# Patient Record
Sex: FEMALE | Race: BLACK OR AFRICAN AMERICAN | NOT HISPANIC OR LATINO | Employment: OTHER | URBAN - METROPOLITAN AREA
[De-identification: names, ages, dates, MRNs, and addresses within clinical notes are randomized per-mention and may not be internally consistent; named-entity substitution may affect disease eponyms.]

---

## 2017-02-10 ENCOUNTER — HOSPITAL ENCOUNTER (EMERGENCY)
Facility: HOSPITAL | Age: 47
Discharge: HOME | End: 2017-02-10
Payer: COMMERCIAL

## 2017-02-10 VITALS
RESPIRATION RATE: 18 BRPM | HEIGHT: 64 IN | HEART RATE: 99 BPM | TEMPERATURE: 99 F | BODY MASS INDEX: 31.92 KG/M2 | DIASTOLIC BLOOD PRESSURE: 98 MMHG | OXYGEN SATURATION: 99 % | SYSTOLIC BLOOD PRESSURE: 163 MMHG | WEIGHT: 187 LBS

## 2017-02-10 DIAGNOSIS — R05.9 COUGH: ICD-10-CM

## 2017-02-10 DIAGNOSIS — J01.11 ACUTE RECURRENT FRONTAL SINUSITIS: Primary | ICD-10-CM

## 2017-02-10 LAB
FLUAV AG SPEC QL IA: NEGATIVE
FLUBV AG SPEC QL IA: NEGATIVE
SPECIMEN SOURCE: NORMAL

## 2017-02-10 PROCEDURE — 87400 INFLUENZA A/B EACH AG IA: CPT

## 2017-02-10 PROCEDURE — 99283 EMERGENCY DEPT VISIT LOW MDM: CPT

## 2017-02-10 RX ORDER — PROMETHAZINE HYDROCHLORIDE AND DEXTROMETHORPHAN HYDROBROMIDE 6.25; 15 MG/5ML; MG/5ML
5 SYRUP ORAL 3 TIMES DAILY PRN
Qty: 240 ML | Refills: 0 | Status: SHIPPED | OUTPATIENT
Start: 2017-02-10 | End: 2017-02-20

## 2017-02-10 RX ORDER — LEVOFLOXACIN 500 MG/1
500 TABLET, FILM COATED ORAL DAILY
Qty: 5 TABLET | Refills: 0 | Status: SHIPPED | OUTPATIENT
Start: 2017-02-10 | End: 2017-02-15

## 2017-02-10 NOTE — ED AVS SNAPSHOT
[facility]  [address] [facility] [address]               Les Hernandze   2/10/2017 11:29 PM   ED    Description:  Female : 1970   Department:  [facility]            Your Care was Coordinated By:     Provider Role From To    Jacob Khoury NP Nurse Practitioner 02/10/17 2306 --      Reason for Visit     Nasal Congestion           Diagnoses this Visit        Comments    Acute recurrent frontal sinusitis    -  Primary     Cough           ED Disposition     None           To Do List           Follow-up Information     Follow up with Valeria Abbasi MD In 2 days.    Specialty:  Family Medicine    Contact information:    [address] [place] [id number]  [phone]         These Medications        Disp Refills Start End    levoFLOXacin (LEVAQUIN) 500 MG tablet 5 tablet 0 2/10/2017 2/15/2017    Take 1 tablet (500 mg total) by mouth once daily. - Oral    promethazine-dextromethorphan (PROMETHAZINE-DM) 6.25-15 mg/5 mL Syrp 240 mL 0 2/10/2017 2017    Take 5 mLs by mouth 3 (three) times daily as needed. - Oral      [facility]     [facility] -  Assistance  Registered nurses in the [facility] provide clinical advisement, health education, appointment booking, and other advisory services.  Call for this free service at [phone].             Medications           Message regarding Medications     Verify the changes and/or additions to your medication regime listed below are the same as discussed with your clinician today.  If any of these changes or additions are incorrect, please notify your healthcare provider.        START taking these NEW medications        Refills    levoFLOXacin (LEVAQUIN) 500 MG tablet 0    Sig: Take 1 tablet (500 mg total) by mouth once daily.    Class: Print    Route: Oral    promethazine-dextromethorphan (PROMETHAZINE-DM) 6.25-15 mg/5 mL Syrp 0    Sig:  "Take 5 mLs by mouth 3 (three) times daily as needed.    Class: Print    Route: Oral           Verify that the below list of medications is an accurate representation of the medications you are currently taking.  If none reported, the list may be blank. If incorrect, please contact your healthcare provider. Carry this list with you in case of emergency.           Current Medications     clonazePAM (KLONOPIN) 1 MG tablet Take 1 mg by mouth 2 (two) times daily as needed for Anxiety.    diclofenac (VOLTAREN) 50 MG EC tablet Take 1 tablet (50 mg total) by mouth 3 (three) times daily as needed (PAIN).    levoFLOXacin (LEVAQUIN) 500 MG tablet Take 1 tablet (500 mg total) by mouth once daily.    methocarbamol (ROBAXIN) 750 MG Tab Take 1 tablet (750 mg total) by mouth 2 (two) times daily as needed (Muscle relaxer).    metoprolol succinate (TOPROL-XL) 25 MG 24 hr tablet Take 25 mg by mouth once daily.    promethazine-dextromethorphan (PROMETHAZINE-DM) 6.25-15 mg/5 mL Syrp Take 5 mLs by mouth 3 (three) times daily as needed.           Clinical Reference Information           Your Vitals Were     BP Pulse Temp Resp Height Weight    163/98 (BP Location: Right arm, Patient Position: Sitting) 99 98.8 °F (37.1 °C) (Oral) 18 5' 4" (1.626 m) 84.8 kg (187 lb)    SpO2 BMI             99% 32.1 kg/m2         Allergies as of 2/10/2017        Reactions    Ibuprofen Diarrhea, Nausea And Vomiting      Immunizations Administered on Date of Encounter - 2/10/2017     None      ED Micro, Lab, POCT     Start Ordered       Status Ordering Provider    02/10/17 2207 02/10/17 2207  Influenza antigen Nasal Swab  STAT      Final result       ED Imaging Orders     None        Discharge Instructions         Sinusitis (Antibiotic Treatment)    The sinuses are air-filled spaces within the bones of the face. They connect to the inside of the nose. Sinusitis is an inflammation of the tissue lining the sinus cavity. Sinus inflammation can occur during a cold. " It can also be due to allergies to pollens and other particles in the air. Sinusitis can cause symptoms of sinus congestion and fullness. A sinus infection causes fever, headache and facial pain. There is often green or yellow drainage from the nose or into the back of the throat (post-nasal drip). You have been given antibiotics to treat this condition.  Home care:  · Take the full course of antibiotics as instructed. Do not stop taking them, even if you feel better.  · Drink plenty of water, hot tea, and other liquids. This may help thin mucus. It also may promote sinus drainage.  · Heat may help soothe painful areas of the face. Use a towel soaked in hot water. Or,  the shower and direct the hot spray onto your face. Using a vaporizer along with a menthol rub at night may also help.   · An expectorant containing guaifenesin may help thin the mucus and promote drainage from the sinuses.  · Over-the-counter decongestants may be used unless a similar medicine was prescribed. Nasal sprays work the fastest. Use one that contains phenylephrine or oxymetazoline. First blow the nose gently. Then use the spray. Do not use these medicines more often than directed on the label or symptoms may get worse. You may also use tablets containing pseudoephedrine. Avoid products that combine ingredients, because side effects may be increased. Read labels. You can also ask the pharmacist for help. (NOTE: Persons with high blood pressure should not use decongestants. They can raise blood pressure.)  · Over-the-counter antihistamines may help if allergies contributed to your sinusitis.    · Do not use nasal rinses or irrigation during an acute sinus infection, unless told to by your health care provider. Rinsing may spread the infection to other sinuses.  · Use acetaminophen or ibuprofen to control pain, unless another pain medicine was prescribed. (If you have chronic liver or kidney disease or ever had a stomach ulcer, talk  with your doctor before using these medicines. Aspirin should never be used in anyone under 18 years of age who is ill with a fever. It may cause severe liver damage.)  · Don't smoke. This can worsen symptoms.  Follow-up care  Follow up with your healthcare provider or our staff if you are not improving within the next week.  When to seek medical advice  Call your healthcare provider if any of these occur:  · Facial pain or headache becoming more severe  · Stiff neck  · Unusual drowsiness or confusion  · Swelling of the forehead or eyelids  · Vision problems, including blurred or double vision  · Fever of 100.4ºF (38ºC) or higher, or as directed by your healthcare provider  · Seizure  · Breathing problems  · Symptoms not resolving within 10 days  Date Last Reviewed: 4/13/2015  © 0703-2236 [employer]. [address] [place] [address]. All rights reserved. This information is not intended as a substitute for professional medical care. Always follow your healthcare professional's instructions.          [facility] Sign-Up     Activating your [facility] account is as easy as 1-2-3!     1) Visit [email or web address], select Sign Up Now, enter this activation code and your date of birth, then select Next.  [email or web address]  Expires: 2/14/2017 11:30 AM      2) Create a username and password to use when you visit [facility] in the future and select a security question in case you lose your password and select Next.    3) Enter your e-mail address and click Sign Up!    Additional Information  If you have questions, please e-mail [email or web address] or call [phone] to talk to our [facility] staff. Remember, [employer] is NOT to be used for urgent needs. For medical emergencies, dial 911.          [facility] complies with applicable Federal civil rights laws and does not discriminate on the basis of race, color, national origin, age, disability, or sex.        Language Assistance Services      ATTENTION: Language assistance services are available, free of charge. Please call [phone].      ATENCIÓN: Si habla español, tiene a amaya disposición servicios gratuitos de asistencia lingüística. Llame al [phone].     CHÚ Ý: N?u b?n nói Ti?ng Vi?t, có các d?ch v? h? tr? ngôn ng? mi?n phí dành cho b?n. G?i s? [phone].

## 2017-02-11 NOTE — DISCHARGE INSTRUCTIONS
Sinusitis (Antibiotic Treatment)    The sinuses are air-filled spaces within the bones of the face. They connect to the inside of the nose. Sinusitis is an inflammation of the tissue lining the sinus cavity. Sinus inflammation can occur during a cold. It can also be due to allergies to pollens and other particles in the air. Sinusitis can cause symptoms of sinus congestion and fullness. A sinus infection causes fever, headache and facial pain. There is often green or yellow drainage from the nose or into the back of the throat (post-nasal drip). You have been given antibiotics to treat this condition.  Home care:  · Take the full course of antibiotics as instructed. Do not stop taking them, even if you feel better.  · Drink plenty of water, hot tea, and other liquids. This may help thin mucus. It also may promote sinus drainage.  · Heat may help soothe painful areas of the face. Use a towel soaked in hot water. Or,  the shower and direct the hot spray onto your face. Using a vaporizer along with a menthol rub at night may also help.   · An expectorant containing guaifenesin may help thin the mucus and promote drainage from the sinuses.  · Over-the-counter decongestants may be used unless a similar medicine was prescribed. Nasal sprays work the fastest. Use one that contains phenylephrine or oxymetazoline. First blow the nose gently. Then use the spray. Do not use these medicines more often than directed on the label or symptoms may get worse. You may also use tablets containing pseudoephedrine. Avoid products that combine ingredients, because side effects may be increased. Read labels. You can also ask the pharmacist for help. (NOTE: Persons with high blood pressure should not use decongestants. They can raise blood pressure.)  · Over-the-counter antihistamines may help if allergies contributed to your sinusitis.    · Do not use nasal rinses or irrigation during an acute sinus infection, unless told to by  your health care provider. Rinsing may spread the infection to other sinuses.  · Use acetaminophen or ibuprofen to control pain, unless another pain medicine was prescribed. (If you have chronic liver or kidney disease or ever had a stomach ulcer, talk with your doctor before using these medicines. Aspirin should never be used in anyone under 18 years of age who is ill with a fever. It may cause severe liver damage.)  · Don't smoke. This can worsen symptoms.  Follow-up care  Follow up with your healthcare provider or our staff if you are not improving within the next week.  When to seek medical advice  Call your healthcare provider if any of these occur:  · Facial pain or headache becoming more severe  · Stiff neck  · Unusual drowsiness or confusion  · Swelling of the forehead or eyelids  · Vision problems, including blurred or double vision  · Fever of 100.4ºF (38ºC) or higher, or as directed by your healthcare provider  · Seizure  · Breathing problems  · Symptoms not resolving within 10 days  Date Last Reviewed: 4/13/2015  © 2120-2539 [employer]. [address] [place] [address], [place] [address]. All rights reserved. This information is not intended as a substitute for professional medical care. Always follow your healthcare professional's instructions.

## 2017-02-12 NOTE — ED PROVIDER NOTES
HISTORY     Chief Complaint   Patient presents with    Nasal Congestion     pt states she has had a cough for a few days and now has body aches     Review of patient's allergies indicates:   Allergen Reactions    Ibuprofen Diarrhea and Nausea And Vomiting        HPI   Patient is a 46 y.o. female presenting with the following complaint: general illness. The history is provided by the patient.   General Illness    The current episode started several days ago. The problem occurs rarely. The problem has been gradually worsening. The pain is at a severity of 5/10. Associated symptoms include a fever, congestion, ear pain, sore throat, muscle aches and cough. Pertinent negatives include no nausea, no shortness of breath and no rash. She has received no recent medical care.        PCP: Valeria Abbasi MD     Past Medical History:  Past Medical History   Diagnosis Date    Diabetes mellitus     High cholesterol     Hypertension     Schizophrenia         Past Surgical History:  Past Surgical History   Procedure Laterality Date    Splenectomy, total          Family History:  No family history on file.     Social History:  Social History     Social History Main Topics    Smoking status: Never Smoker    Smokeless tobacco: Not on file    Alcohol use No    Drug use: No    Sexual activity: Not on file         ROS   Review of Systems   Constitutional: Positive for fever.   HENT: Positive for congestion, ear pain and sore throat.    Respiratory: Positive for cough. Negative for shortness of breath.    Cardiovascular: Negative for chest pain.   Gastrointestinal: Negative for nausea.   Genitourinary: Negative for dysuria.   Musculoskeletal: Negative for back pain.   Skin: Negative for rash.   Neurological: Negative for weakness.   Hematological: Does not bruise/bleed easily.       PHYSICAL EXAM   Initial Vitals   BP Pulse Resp Temp SpO2   02/10/17 2204 02/10/17 2204 02/10/17 2204 02/10/17 2204 02/10/17 2204   163/98 99  "18 98.8 °F (37.1 °C) 99 %       Physical Exam    Constitutional: She appears well-developed and well-nourished. No distress.   HENT:   Head: Normocephalic and atraumatic.   Right Ear: A middle ear effusion is present.   Left Ear: A middle ear effusion is present.   Nose: Mucosal edema and rhinorrhea present. Right sinus exhibits frontal sinus tenderness. Left sinus exhibits frontal sinus tenderness.   Mouth/Throat: Posterior oropharyngeal erythema present.   Eyes: Conjunctivae are normal. Pupils are equal, round, and reactive to light.   Neck: Normal range of motion. Neck supple.   Cardiovascular: Normal rate, regular rhythm, normal heart sounds and intact distal pulses.   Pulmonary/Chest: Breath sounds normal.   Abdominal: Soft. Bowel sounds are normal. She exhibits no distension. There is no tenderness. There is no rebound.   Musculoskeletal: Normal range of motion. She exhibits no edema.   Neurological: She is alert and oriented to person, place, and time. She has normal strength.   Skin: Skin is warm and dry.   Psychiatric: She has a normal mood and affect.          ED COURSE   Procedures  ED ONGOING VITALS:  Vitals:    02/10/17 2204   BP: (!) 163/98   Pulse: 99   Resp: 18   Temp: 98.8 °F (37.1 °C)   TempSrc: Oral   SpO2: 99%   Weight: 84.8 kg (187 lb)   Height: 5' 4" (1.626 m)         ABNORMAL LAB VALUES:  Labs Reviewed   INFLUENZA A AND B ANTIGEN         ALL LAB VALUES:  Results for orders placed or performed during the hospital encounter of 02/10/17   Influenza antigen Nasal Swab   Result Value Ref Range    Influenza A Ag, EIA Negative Negative    Influenza B Ag, EIA Negative Negative    Flu A & B Source Nasal Swab            RADIOLOGY STUDIES:  Imaging Results     None                    The above vital signs and test results have been reviewed by the emergency provider.     ED Medications:  Medications - No data to display    Discharge Medications:  Discharge Medication List as of 2/10/2017 11:35 PM    "   START taking these medications    Details   levoFLOXacin (LEVAQUIN) 500 MG tablet Take 1 tablet (500 mg total) by mouth once daily., Starting 2/10/2017, Until Wed 2/15/17, Print      promethazine-dextromethorphan (PROMETHAZINE-DM) 6.25-15 mg/5 mL Syrp Take 5 mLs by mouth 3 (three) times daily as needed., Starting 2/10/2017, Until Mon 2/20/17, Print            Follow-up Information     Follow up with Valeria Abbasi MD In 2 days.    Specialty:  Family Medicine    Contact information:    [facility] [address]  [phone]           I discussed with patient and/or family/caretaker that evaluation in the ED does not suggest any emergent or life threatening medical conditions requiring immediate intervention beyond what was provided in the ED, and I believe patient is safe for discharge. Regardless, an unremarkable evaluation in the ED does not preclude the development or presence of a serious or life threatening condition. As such, patient was instructed to return immediately for any worsening or change in current symptoms.    Pre-hypertension/Hypertension: The pt has been informed that they may have pre-hypertension or hypertension based on a blood pressure reading in the ED. I recommend that the pt call the PCP listed on their discharge instructions or a physician of their choice this week to arrange f/u for further evaluation of possible pre-hypertension or hypertension.       MEDICAL DECISION MAKING                 CLINICAL IMPRESSION       ICD-10-CM ICD-9-CM   1. Acute recurrent frontal sinusitis J01.11 461.1   2. Cough R05 786.2               Jacob Khoury NP  02/11/17 2154

## 2017-04-24 ENCOUNTER — HOSPITAL ENCOUNTER (EMERGENCY)
Facility: HOSPITAL | Age: 47
Discharge: HOME | End: 2017-04-24
Attending: EMERGENCY MEDICINE
Payer: COMMERCIAL

## 2017-04-24 VITALS
RESPIRATION RATE: 16 BRPM | WEIGHT: 199 LBS | TEMPERATURE: 98 F | SYSTOLIC BLOOD PRESSURE: 146 MMHG | BODY MASS INDEX: 31.98 KG/M2 | HEIGHT: 66 IN | OXYGEN SATURATION: 96 % | HEART RATE: 94 BPM | DIASTOLIC BLOOD PRESSURE: 79 MMHG

## 2017-04-24 DIAGNOSIS — R05.9 COUGH: Primary | ICD-10-CM

## 2017-04-24 PROCEDURE — 99283 EMERGENCY DEPT VISIT LOW MDM: CPT

## 2017-04-24 RX ORDER — PROMETHAZINE HYDROCHLORIDE AND DEXTROMETHORPHAN HYDROBROMIDE 6.25; 15 MG/5ML; MG/5ML
5 SYRUP ORAL EVERY 6 HOURS PRN
Qty: 120 ML | Refills: 0 | Status: SHIPPED | OUTPATIENT
Start: 2017-04-24 | End: 2017-05-04

## 2017-04-24 NOTE — ED AVS SNAPSHOT
[facility]  [address] [facility] [address]               Les Hernandez   2017  1:07 PM   ED    Description:  Female : 1970   Department:  [facility]            Your Care was Coordinated By:     Provider Role From To    Cooper Flynn MD Attending Provider 17 1307 --    Darcy Nixon PA-C Physician Assistant 17 1307 17 1307      Reason for Visit     Cough           Diagnoses this Visit        Comments    Cough    -  Primary       ED Disposition     ED Disposition Condition Comment    Discharge             To Do List           Follow-up Information     Follow up with Valeria Abbasi MD In 2 days.    Specialty:  Family Medicine    Contact information:    [address] [place] [id number] [phone]         These Medications        Disp Refills Start End    promethazine-dextromethorphan (PROMETHAZINE-DM) 6.25-15 mg/5 mL Syrp 120 mL 0 2017    Take 5 mLs by mouth every 6 (six) hours as needed. - Oral      [facility]     [facility] Care [facility] -  Assistance  Unless otherwise directed by your provider, please contact [facility], our nurse care line that is available for  assistance.     Registered nurses in the [facility] provide: appointment scheduling, clinical advisement, health education, and other advisory services.  Call: [phone] (toll free)               Medications           Message regarding Medications     Verify the changes and/or additions to your medication regime listed below are the same as discussed with your clinician today.  If any of these changes or additions are incorrect, please notify your healthcare provider.        START taking these NEW medications        Refills    promethazine-dextromethorphan (PROMETHAZINE-DM) 6.25-15 mg/5 mL Syrp 0    Sig: Take 5 mLs by mouth every 6 (six) hours as needed.    Class: Print  "   Route: Oral           Verify that the below list of medications is an accurate representation of the medications you are currently taking.  If none reported, the list may be blank. If incorrect, please contact your healthcare provider. Carry this list with you in case of emergency.           Current Medications     clonazePAM (KLONOPIN) 1 MG tablet Take 1 mg by mouth 2 (two) times daily as needed for Anxiety.    diclofenac (VOLTAREN) 50 MG EC tablet Take 1 tablet (50 mg total) by mouth 3 (three) times daily as needed (PAIN).    methocarbamol (ROBAXIN) 750 MG Tab Take 1 tablet (750 mg total) by mouth 2 (two) times daily as needed (Muscle relaxer).    metoprolol succinate (TOPROL-XL) 25 MG 24 hr tablet Take 25 mg by mouth once daily.    promethazine-dextromethorphan (PROMETHAZINE-DM) 6.25-15 mg/5 mL Syrp Take 5 mLs by mouth every 6 (six) hours as needed.           Clinical Reference Information           Your Vitals Were     BP Pulse Temp Resp Height Weight    146/79 (BP Location: Right arm, Patient Position: Sitting) 94 98.3 °F (36.8 °C) (Oral) 16 5' 6" (1.676 m) 90.3 kg (199 lb)    SpO2 BMI             96% 32.12 kg/m2         Allergies as of 4/24/2017        Reactions    Ibuprofen Diarrhea, Nausea And Vomiting      Immunizations Administered on Date of Encounter - 4/24/2017     None      ED Micro, Lab, POCT     None      ED Imaging Orders     None      Discharge References/Attachments     VIRAL SYNDROME (ADULT) (ENGLISH)      [facility] Sign-Up     Activating your [facility] account is as easy as 1-2-3!     1) Visit [email or web address], select Sign Up Now, enter this activation code and your date of birth, then select Next.  [email or web address]  Expires: 6/8/2017  1:29 PM      2) Create a username and password to use when you visit [facility] in the future and select a security question in case you lose your password and select Next.    3) Enter your e-mail address and click Sign Up!    Additional Information  If you have " questions, please e-mail [email or web address] or call [phone] to talk to our [facility] staff. Remember, [facility] is NOT to be used for urgent needs. For medical emergencies, dial 911.          [facility] complies with applicable Federal civil rights laws and does not discriminate on the basis of race, color, national origin, age, disability, or sex.        Language Assistance Services     ATTENTION: Language assistance services are available, free of charge. Please call [phone].      ATENCIÓN: Si habla español, tiene a amaya disposición servicios gratuitos de asistencia lingüística. Llame al [phone].     CHÚ Ý: N?u b?n nói Ti?ng Vi?t, có các d?ch v? h? tr? ngôn ng? mi?n phí dành cho b?n. G?i s? [phone].

## 2017-04-24 NOTE — ED PROVIDER NOTES
SCRIBE #1 NOTE: I, Ayla Meredith, am scribing for, and in the presence of, Cooper Flynn MD. I have scribed the entire note.      History      Chief Complaint   Patient presents with    Cough     pt states the top of her back is tight with alot of coughing       Review of patient's allergies indicates:   Allergen Reactions    Ibuprofen Diarrhea and Nausea And Vomiting        HPI   HPI    4/24/2017, 1:27 PM   History obtained from the patient      History of Present Illness: Les Hernandez is a 47 y.o. female patient who presents to the Emergency Department for a cough which onset gradually 3 days ago. Cough is productive with clear sputum. Sx have been episodic and moderate in severity. No modifying factors noted. No associated sx included. Pt denies any fever, chills, CP, SOB, ear pain, sore throat, or rash. No further complaints at this time.       Arrival mode: Personal vehicle     PCP: Valeria Abbasi MD       Past Medical History:  Past Medical History:   Diagnosis Date    Diabetes mellitus     High cholesterol     Hypertension     Schizophrenia        Past Surgical History:  Past Surgical History:   Procedure Laterality Date    SPLENECTOMY, TOTAL           Family History:  No family history on file.    Social History:  Social History     Social History Main Topics    Smoking status: Never Smoker    Smokeless tobacco: Not on file    Alcohol use No    Drug use: No    Sexual activity: Not on file       ROS   Review of Systems   Constitutional: Negative for chills, diaphoresis and fever.   HENT: Negative for ear discharge, ear pain, sore throat and trouble swallowing.    Respiratory: Positive for cough. Negative for shortness of breath.    Cardiovascular: Negative for chest pain.   Gastrointestinal: Negative for nausea and vomiting.   Genitourinary: Negative for dysuria.   Musculoskeletal: Negative for back pain.   Skin: Negative for rash.   Neurological: Negative for dizziness, weakness,  "light-headedness, numbness and headaches.   Hematological: Does not bruise/bleed easily.       Physical Exam    Initial Vitals   BP Pulse Resp Temp SpO2   04/24/17 1258 04/24/17 1258 04/24/17 1258 04/24/17 1258 04/24/17 1258   146/79 94 16 98.3 °F (36.8 °C) 96 %      Physical Exam  Nursing Notes and Vital Signs Reviewed.  Constitutional: Patient is in no acute distress. Awake and alert. Well-developed and well-nourished.  Head: Atraumatic. Normocephalic.  Eyes: PERRL. EOM intact. Conjunctivae are not pale. No scleral icterus.  ENT: Mucous membranes are moist. Oropharynx is clear and symmetric.    Neck: Supple. Full ROM. No lymphadenopathy.  Cardiovascular: Regular rate. Regular rhythm. No murmurs, rubs, or gallops. Distal pulses are 2+ and symmetric.  Pulmonary/Chest: No respiratory distress. Clear to auscultation bilaterally. No wheezing, rales, or rhonchi.  Musculoskeletal: Moves all extremities. No obvious deformities.   Skin: Warm and dry.  Neurological:  Alert, awake, and appropriate.  Normal speech.  No acute focal neurological deficits are appreciated.  Psychiatric: Normal affect. Good eye contact. Appropriate in content.    ED Course    Procedures  ED Vital Signs:  Vitals:    04/24/17 1258   BP: (!) 146/79   Pulse: 94   Resp: 16   Temp: 98.3 °F (36.8 °C)   TempSrc: Oral   SpO2: 96%   Weight: 90.3 kg (199 lb)   Height: 5' 6" (1.676 m)                The Emergency Provider reviewed the vital signs and test results, which are outlined above.    ED Discussion     1:29 PM Discharge: Initial encounter.  Pt assessed at this time.  Discussed exam results, shared treatment plan, f/u instructions, and specific conditions for return. Answered their questions at this time. Pt understands and agrees to the plan. Pt is stable for discharge.       New Prescriptions    PROMETHAZINE-DEXTROMETHORPHAN (PROMETHAZINE-DM) 6.25-15 MG/5 ML SYRP    Take 5 mLs by mouth every 6 (six) hours as needed.       Follow-up Information     " Follow up with Valeria Abbasi MD In 2 days.    Specialty:  Family Medicine    Contact information:    [address] [place] [address] [id number] [phone]              Medical Decision Making              Scribe Attestation:   Scribe #1: I performed the above scribed service and the documentation accurately describes the services I performed. I attest to the accuracy of the note.    Attending:   Physician Attestation Statement for Scribe #1: I, Cooper Flynn MD, personally performed the services described in this documentation, as scribed by Ayla Meredith, in my presence, and it is both accurate and complete.          Clinical Impression       ICD-10-CM ICD-9-CM   1. Cough R05 786.2       Disposition:   Disposition: Discharged  Condition: Stable         Cooper Flynn MD  04/24/17 7603

## 2017-04-24 NOTE — ED NOTES
Pt examined by DR Flynn  without RN, educated on prescriptions, given discharge instructions and discharged to [facility]. See provider notes for exam.

## 2017-11-16 ENCOUNTER — HOSPITAL ENCOUNTER (EMERGENCY)
Facility: HOSPITAL | Age: 47
Discharge: HOME | End: 2017-11-16
Attending: EMERGENCY MEDICINE
Payer: COMMERCIAL

## 2017-11-16 VITALS
WEIGHT: 188.94 LBS | OXYGEN SATURATION: 99 % | HEART RATE: 90 BPM | RESPIRATION RATE: 18 BRPM | SYSTOLIC BLOOD PRESSURE: 138 MMHG | BODY MASS INDEX: 30.49 KG/M2 | TEMPERATURE: 98 F | DIASTOLIC BLOOD PRESSURE: 87 MMHG

## 2017-11-16 DIAGNOSIS — Z90.81 HISTORY OF SPLENECTOMY: ICD-10-CM

## 2017-11-16 DIAGNOSIS — J06.9 UPPER RESPIRATORY TRACT INFECTION, UNSPECIFIED TYPE: Primary | ICD-10-CM

## 2017-11-16 DIAGNOSIS — J02.9 PHARYNGITIS, UNSPECIFIED ETIOLOGY: ICD-10-CM

## 2017-11-16 PROCEDURE — 99283 EMERGENCY DEPT VISIT LOW MDM: CPT

## 2017-11-16 RX ORDER — BENZONATATE 100 MG/1
100 CAPSULE ORAL 3 TIMES DAILY PRN
Qty: 20 CAPSULE | Refills: 0 | Status: SHIPPED | OUTPATIENT
Start: 2017-11-16 | End: 2017-11-26

## 2017-11-16 RX ORDER — CETIRIZINE HYDROCHLORIDE 10 MG/1
10 TABLET ORAL DAILY
Qty: 30 TABLET | Refills: 0 | Status: SHIPPED | OUTPATIENT
Start: 2017-11-16 | End: 2018-08-19 | Stop reason: CLARIF

## 2017-11-16 RX ORDER — AMOXICILLIN 500 MG/1
1000 CAPSULE ORAL EVERY 12 HOURS
Qty: 40 CAPSULE | Refills: 0 | Status: SHIPPED | OUTPATIENT
Start: 2017-11-16 | End: 2017-11-20

## 2017-11-16 NOTE — ED NOTES
Pt examined by Dr. Frazier without RN, educated on prescriptions, given discharge instructions and discharged to [facility].  See provider notes for exam

## 2017-11-16 NOTE — ED PROVIDER NOTES
SCRIBE #1 NOTE: I, Corinne Mack, am scribing for, and in the presence of, Javi Frazier Jr., MD. I have scribed the entire note.      History      Chief Complaint   Patient presents with    Cough     dry cough x 3 days       Review of patient's allergies indicates:   Allergen Reactions    Ibuprofen Diarrhea and Nausea And Vomiting        HPI   HPI    11/16/2017, 4:55 PM   History obtained from the patient      History of Present Illness: Les Hernandez is a 47 y.o. female patient who presents to the Emergency Department for cough which onset gradually 3 days ago. Symptoms are constant and moderate in severity. No mitigating or exacerbating factors reported. Associated sxs include congestion. Patient denies any fever, chills, rhinorrhea, CP, SOB, N/V/D, back pain, HA, dizziness, and all other sxs at this time. No prior Tx reported. Pt has Hx of HTN, DM, and splenectomy. No further complaints or concerns at this time.         Arrival mode: Personal vehicle      PCP: Valeria Abbasi MD       Past Medical History:  Past Medical History:   Diagnosis Date    Diabetes mellitus     High cholesterol     Hypertension     Schizophrenia        Past Surgical History:  Past Surgical History:   Procedure Laterality Date    SPLENECTOMY, TOTAL           Family History:  No family history on file.    Social History:  Social History     Social History Main Topics    Smoking status: Never Smoker    Smokeless tobacco: Not on file    Alcohol use No    Drug use: No    Sexual activity: Not on file       ROS   Review of Systems   Constitutional: Negative for chills and fever.   HENT: Positive for congestion. Negative for rhinorrhea, sinus pain and sneezing.    Respiratory: Positive for cough. Negative for shortness of breath.    Cardiovascular: Negative for chest pain and leg swelling.   Gastrointestinal: Negative for abdominal pain, diarrhea, nausea and vomiting.   Musculoskeletal: Negative for back pain, neck pain  and neck stiffness.   Skin: Negative for rash and wound.   Neurological: Negative for dizziness, light-headedness, numbness and headaches.   All other systems reviewed and are negative.    Physical Exam      Initial Vitals [11/16/17 1632]   BP Pulse Resp Temp SpO2   138/87 90 18 98.3 °F (36.8 °C) 99 %      MAP       104          Physical Exam  Nursing Notes and Vital Signs Reviewed.  Constitutional: Patient is in no apparent distress. Well-developed and well-nourished.  Head: Atraumatic. Normocephalic.  Eyes: PERRL. EOM intact. Conjunctivae are not pale. No scleral icterus.  ENT: Mucous membranes are moist. Oropharynx is red. Nasal congestion. Postnasal drip.   Neck: Supple. Full ROM. No lymphadenopathy.  Cardiovascular: Regular rate. Regular rhythm. No murmurs, rubs, or gallops. Distal pulses are 2+ and symmetric.  Pulmonary/Chest: No respiratory distress. Clear to auscultation bilaterally. No wheezing or rales.  Abdominal: Soft and non-distended.    Musculoskeletal: Moves all extremities. No obvious deformities.   Skin: Warm and dry.  Neurological:  Alert, awake, and appropriate.  Normal speech.  No acute focal neurological deficits are appreciated.  Psychiatric: Normal affect. Good eye contact. Appropriate in content.    ED Course    Procedures  ED Vital Signs:  Vitals:    11/16/17 1632   BP: 138/87   Pulse: 90   Resp: 18   Temp: 98.3 °F (36.8 °C)   TempSrc: Oral   SpO2: 99%   Weight: 85.7 kg (188 lb 15 oz)       Abnormal Lab Results:  Labs Reviewed - No data to display     All Lab Results:  None    Imaging Results:  Imaging Results    None                 The Emergency Provider reviewed the vital signs and test results, which are outlined above.    ED Discussion     4:57 PM: Pt is awake, alert, and in no distress. Discussed pt dx and plan of tx. Gave pt all f/u and return to the ED instructions. All questions and concerns were addressed at this time. Pt expresses understanding of information and instructions,  and is comfortable with plan to discharge. Pt is stable for discharge.    I discussed with patient and/or family/caretaker that evaluation in the ED does not suggest any emergent or life threatening medical conditions requiring immediate intervention beyond what was provided in the ED, and I believe patient is safe for discharge.  Regardless, an unremarkable evaluation in the ED does not preclude the development or presence of a serious of life threatening condition. As such, patient was instructed to return immediately for any worsening or change in current symptoms.      ED Medication(s):  Medications - No data to display    New Prescriptions    AMOXICILLIN (AMOXIL) 500 MG CAPSULE    Take 2 capsules (1,000 mg total) by mouth every 12 (twelve) hours.    BENZONATATE (TESSALON) 100 MG CAPSULE    Take 1 capsule (100 mg total) by mouth 3 (three) times daily as needed for Cough.    CETIRIZINE (ZYRTEC) 10 MG TABLET    Take 1 tablet (10 mg total) by mouth once daily. Prn congestion       Follow-up Information     Valeria Abbasi MD.    Specialty:  Family Medicine  Contact information:  [address] [place] [id number]  [phone]                     Medical Decision Making              Scribe Attestation:   Scribe #1: I performed the above scribed service and the documentation accurately describes the services I performed. I attest to the accuracy of the note.    Attending:   Physician Attestation Statement for Scribe #1: I, Javi Frazier Jr., MD, personally performed the services described in this documentation, as scribed by Corinne Mack, in my presence, and it is both accurate and complete.          Clinical Impression       ICD-10-CM ICD-9-CM   1. Upper respiratory tract infection, unspecified type J06.9 465.9   2. Pharyngitis, unspecified etiology J02.9 462   3. History of splenectomy Z90.81 V45.79       Disposition:   Disposition: Discharged  Condition: Stable         Javi Frazier Jr., MD  11/16/17  2010

## 2017-11-20 ENCOUNTER — HOSPITAL ENCOUNTER (EMERGENCY)
Facility: HOSPITAL | Age: 47
Discharge: HOME | End: 2017-11-20
Payer: COMMERCIAL

## 2017-11-20 VITALS
RESPIRATION RATE: 20 BRPM | OXYGEN SATURATION: 100 % | DIASTOLIC BLOOD PRESSURE: 73 MMHG | TEMPERATURE: 99 F | BODY MASS INDEX: 28.5 KG/M2 | WEIGHT: 188.06 LBS | SYSTOLIC BLOOD PRESSURE: 132 MMHG | HEART RATE: 89 BPM | HEIGHT: 68 IN

## 2017-11-20 DIAGNOSIS — J02.9 SORE THROAT: Primary | ICD-10-CM

## 2017-11-20 DIAGNOSIS — J03.90 TONSILLITIS: ICD-10-CM

## 2017-11-20 DIAGNOSIS — R52 BODY ACHES: ICD-10-CM

## 2017-11-20 LAB — DEPRECATED S PYO AG THROAT QL EIA: NEGATIVE

## 2017-11-20 PROCEDURE — 87081 CULTURE SCREEN ONLY: CPT

## 2017-11-20 PROCEDURE — 99284 EMERGENCY DEPT VISIT MOD MDM: CPT | Mod: 25

## 2017-11-20 PROCEDURE — 63600175 PHARM REV CODE 636 W HCPCS: Performed by: REGISTERED NURSE

## 2017-11-20 PROCEDURE — 87880 STREP A ASSAY W/OPTIC: CPT

## 2017-11-20 PROCEDURE — 96372 THER/PROPH/DIAG INJ SC/IM: CPT

## 2017-11-20 RX ORDER — AMOXICILLIN AND CLAVULANATE POTASSIUM 875; 125 MG/1; MG/1
1 TABLET, FILM COATED ORAL 2 TIMES DAILY
Qty: 14 TABLET | Refills: 0 | Status: SHIPPED | OUTPATIENT
Start: 2017-11-20 | End: 2018-08-19 | Stop reason: CLARIF

## 2017-11-20 RX ORDER — NAPROXEN 375 MG/1
375 TABLET ORAL 2 TIMES DAILY WITH MEALS
Qty: 20 TABLET | Refills: 0 | Status: SHIPPED | OUTPATIENT
Start: 2017-11-20 | End: 2018-05-16

## 2017-11-20 RX ORDER — METHYLPREDNISOLONE 4 MG/1
TABLET ORAL
Qty: 1 PACKAGE | Refills: 0 | Status: SHIPPED | OUTPATIENT
Start: 2017-11-20 | End: 2017-12-11

## 2017-11-20 RX ORDER — DEXAMETHASONE SODIUM PHOSPHATE 4 MG/ML
8 INJECTION, SOLUTION INTRA-ARTICULAR; INTRALESIONAL; INTRAMUSCULAR; INTRAVENOUS; SOFT TISSUE
Status: COMPLETED | OUTPATIENT
Start: 2017-11-20 | End: 2017-11-20

## 2017-11-20 RX ADMIN — DEXAMETHASONE SODIUM PHOSPHATE 8 MG: 4 INJECTION, SOLUTION INTRAMUSCULAR; INTRAVENOUS at 01:11

## 2017-11-20 NOTE — ED PROVIDER NOTES
"   History      Chief Complaint   Patient presents with    Generalized Body Aches     Pt states, "My throat is sore, my glands are swollen, my bones are hurting."       Review of patient's allergies indicates:   Allergen Reactions    Ibuprofen Diarrhea and Nausea And Vomiting        HPI   HPI    11/20/2017, 12:59 PM   History obtained from the patient      History of Present Illness: Les Hernandez is a 47 y.o. female patient who presents to the Emergency Department for sore throat and body aches which onset gradually since yesterday. Symptoms are constant and moderate in severity.  No mitigating or exacerbating factors reported. Associated sxs include painful swallowing and body aches. Patient denies any fever, SOB or CP, and all other sxs at this time. No prior tx. No further complaints or concerns at this time.         Arrival mode: Personal vehicle      PCP: Valeria Abbasi MD       Past Medical History:  Past Medical History:   Diagnosis Date    Diabetes mellitus     High cholesterol     Hypertension     Schizophrenia        Past Surgical History:  Past Surgical History:   Procedure Laterality Date    SPLENECTOMY, TOTAL           Family History:  No family history on file.    Social History:  Social History     Social History Main Topics    Smoking status: Never Smoker    Smokeless tobacco: Not on file    Alcohol use No    Drug use: No    Sexual activity: Not on file       ROS   Review of Systems   Constitutional: Negative for fever.   HENT: Positive for sore throat and trouble swallowing.    Respiratory: Negative for shortness of breath.    Cardiovascular: Negative for chest pain.   Gastrointestinal: Negative for nausea.   Genitourinary: Negative for dysuria.   Musculoskeletal: Positive for myalgias. Negative for back pain.   Skin: Negative for rash.   Neurological: Negative for weakness.   Hematological: Does not bruise/bleed easily.   All other systems reviewed and are " "negative.      Physical Exam      Initial Vitals [11/20/17 1240]   BP Pulse Resp Temp SpO2   132/73 89 20 98.6 °F (37 °C) 100 %      MAP       92.67          Physical Exam  Nursing Notes and Vital Signs Reviewed.  Constitutional: Patient is in no acute distress. Well-developed and well-nourished.  Head: Atraumatic. Normocephalic.  Eyes: PERRL. EOM intact. Conjunctivae are not pale. No scleral icterus.  ENT: Mucous membranes are moist. Posterior pharynx with erythema, tonsils +2 with exudate.    Neck: Supple. Full ROM. No lymphadenopathy.  Cardiovascular: Regular rate. Regular rhythm. No murmurs, rubs, or gallops. Distal pulses are 2+ and symmetric.  Pulmonary/Chest: No respiratory distress. Clear to auscultation bilaterally. No wheezing or rales.  Abdominal: Soft and non-distended.  There is no tenderness.  No rebound, guarding, or rigidity. Good bowel sounds.  Genitourinary: No CVA tenderness  Musculoskeletal: Moves all extremities. No obvious deformities. No edema. No calf tenderness.  Skin: Warm and dry.  Neurological:  Alert, awake, and appropriate.  Normal speech.  No acute focal neurological deficits are appreciated.  Psychiatric: Normal affect. Good eye contact. Appropriate in content.    ED Course    Procedures  ED Vital Signs:  Vitals:    11/20/17 1240   BP: 132/73   Pulse: 89   Resp: 20   Temp: 98.6 °F (37 °C)   TempSrc: Oral   SpO2: 100%   Weight: 85.3 kg (188 lb 0.8 oz)   Height: 5' 7.5" (1.715 m)       Abnormal Lab Results:  Labs Reviewed   THROAT SCREEN, RAPID   CULTURE, STREP A,  THROAT        All Lab Results:  Results for orders placed or performed during the hospital encounter of 11/20/17   Rapid strep screen   Result Value Ref Range    Rapid Strep A Screen Negative Negative         Imaging Results:  Imaging Results    None                 The Emergency Provider reviewed the vital signs and test results, which are outlined above.    ED Discussion     2:35 PM: Reassessed pt at this time.  Pt states " her condition has improved at this time. Discussed with pt all pertinent ED information and results. Discussed pt dx of sore throat and plan of tx. Gave pt all f/u and return to the ED instructions. All questions and concerns were addressed at this time. Pt expresses understanding of information and instructions, and is comfortable with plan to discharge. Pt is stable for discharge.        ED Medication(s):  Medications   dexamethasone injection 8 mg (8 mg Intramuscular Given 11/20/17 1314)       New Prescriptions    AMOXICILLIN-CLAVULANATE 875-125MG (AUGMENTIN) 875-125 MG PER TABLET    Take 1 tablet by mouth 2 (two) times daily.    METHYLPREDNISOLONE (MEDROL DOSEPACK) 4 MG TABLET    Take as directed    NAPROXEN (NAPROSYN) 375 MG TABLET    Take 1 tablet (375 mg total) by mouth 2 (two) times daily with meals.       Follow-up Information     Valeria Abbasi MD In 1 week.    Specialty:  Family Medicine  Why:  If symptoms worsen  Contact information:  [address] [place] [id number] [phone]                     Medical Decision Making                     Clinical Impression       ICD-10-CM ICD-9-CM   1. Sore throat J02.9 462   2. Tonsillitis J03.90 463   3. Body aches R52 780.96               Javi Weinstein Jr., FNP  11/20/17 1536

## 2017-11-23 LAB — BACTERIA THROAT CULT: NORMAL

## 2018-05-16 ENCOUNTER — HOSPITAL ENCOUNTER (EMERGENCY)
Facility: HOSPITAL | Age: 48
Discharge: HOME | End: 2018-05-16
Payer: COMMERCIAL

## 2018-05-16 VITALS
WEIGHT: 198.44 LBS | BODY MASS INDEX: 31.89 KG/M2 | SYSTOLIC BLOOD PRESSURE: 119 MMHG | DIASTOLIC BLOOD PRESSURE: 67 MMHG | HEART RATE: 83 BPM | HEIGHT: 66 IN | TEMPERATURE: 98 F | RESPIRATION RATE: 18 BRPM | OXYGEN SATURATION: 95 %

## 2018-05-16 DIAGNOSIS — H10.9 CONJUNCTIVITIS, UNSPECIFIED CONJUNCTIVITIS TYPE, UNSPECIFIED LATERALITY: ICD-10-CM

## 2018-05-16 DIAGNOSIS — S60.221A CONTUSION OF RIGHT HAND, INITIAL ENCOUNTER: Primary | ICD-10-CM

## 2018-05-16 PROCEDURE — 99283 EMERGENCY DEPT VISIT LOW MDM: CPT | Mod: 25

## 2018-05-16 RX ORDER — NAPROXEN 375 MG/1
375 TABLET ORAL 2 TIMES DAILY WITH MEALS
Qty: 20 TABLET | Refills: 0 | Status: SHIPPED | OUTPATIENT
Start: 2018-05-16 | End: 2018-08-19 | Stop reason: CLARIF

## 2018-05-16 RX ORDER — GENTAMICIN SULFATE 3 MG/ML
2 SOLUTION/ DROPS OPHTHALMIC 4 TIMES DAILY
Qty: 5 ML | Refills: 0 | Status: SHIPPED | OUTPATIENT
Start: 2018-05-16 | End: 2018-05-21

## 2018-05-17 NOTE — ED PROVIDER NOTES
History      Chief Complaint   Patient presents with    Wrist Pain     pt reports hitting right hand on table PTA    Eye Pain     left eye pain and drainage       Review of patient's allergies indicates:   Allergen Reactions    Ibuprofen Diarrhea and Nausea And Vomiting        HPI   HPI    5/16/2018, 10:55 PM   History obtained from the patient      History of Present Illness: Les Hernandez is a 48 y.o. female patient who presents to the Emergency Department for eye redness, drainage for 2 days and also right hand pain since striking it on table pta.  Daughter also here with conjunctivitis.  Denies f/n/v. Symptoms are constant and moderate in severity.  No mitigating or exacerbating factors reported.   No further complaints or concerns at this time.           PCP: Valeria Abbasi MD       Past Medical History:  Past Medical History:   Diagnosis Date    Diabetes mellitus     High cholesterol     Hypertension     Schizophrenia          Past Surgical History:  Past Surgical History:   Procedure Laterality Date    SPLENECTOMY, TOTAL             Family History:  No family history on file.        Social History:  Social History     Social History Main Topics    Smoking status: Never Smoker    Smokeless tobacco: Not on file    Alcohol use No    Drug use: No    Sexual activity: Not on file       ROS   Review of Systems   Constitutional: Negative for activity change, chills and fever.   HENT: Negative for ear discharge and trouble swallowing.    Eyes: Positive for discharge and redness.   Respiratory: Negative for cough.    Cardiovascular: Negative for palpitations and leg swelling.   Gastrointestinal: Negative for diarrhea and vomiting.   Genitourinary: Negative for decreased urine volume and hematuria.   Musculoskeletal: Negative for joint swelling.   Skin: Negative for pallor, rash and wound.   Neurological: Negative for seizures, weakness and headaches.   Hematological: Does not bruise/bleed  "easily.   All other systems reviewed and are negative.            Review of Systems    Physical Exam      Initial Vitals [05/16/18 2237]   BP Pulse Resp Temp SpO2   119/67 83 18 98.4 °F (36.9 °C) 95 %      MAP       84.33         Physical Exam  Vital signs and nursing notes reviewed.  Constitutional: Patient is in NAD. Awake and alert. Well-developed and well-nourished.  Head: Atraumatic. Normocephalic.  Eyes: PERRL. EOM intact.  No scleral icterus.  Left eye with diffuse conjunctival erythema.  No fb, vesicles, facial edema or proptosis.  ENT: Mucous membranes are moist. Oropharynx is clear.  Neck: Supple. No JVD. No lymphadenopathy.  No meningismus  Cardiovascular: Regular rate and rhythm. No murmurs, rubs, or gallops. Distal pulses are 2+ and symmetric.  Pulmonary/Chest: No respiratory distress. Clear to auscultation bilaterally. No wheezing, rales, or rhonchi.  Abdominal: Soft. Non-distended. No TTP. No rebound, guarding, or rigidity. Good bowel sounds.  Genitourinary: No CVA tenderness  Musculoskeletal: Moves all extremities. No edema.  Right hand with ttp over 2nd metacarpal.  FROM of wrist and fingers.  Normal sensation, and cap refill less than 2, to fingers x 5.  Skin: Warm and dry.  Neurological: Awake and alert. No acute focal neurological deficits are appreciated.  Psychiatric: Normal affect. Good eye contact. Appropriate in content.      ED Course      Procedures  ED Vital Signs:  Vitals:    05/16/18 2237   BP: 119/67   Pulse: 83   Resp: 18   Temp: 98.4 °F (36.9 °C)   TempSrc: Oral   SpO2: 95%   Weight: 90 kg (198 lb 6.6 oz)   Height: 5' 6" (1.676 m)                 Imaging Results:  Imaging Results          X-Ray Hand 3 View Right (Final result)  Result time 05/16/18 23:22:14    Final result by JANEL Holcomb Sr., MD (05/16/18 23:22:14)                 Impression:      There is no acute fracture visualized.  Surgical hardware is visualized in the 2nd metacarpal.      Electronically signed by: Toribio" MD Aicha  Date:    05/16/2018  Time:    23:22             Narrative:    EXAMINATION:  XR HAND COMPLETE 3 VIEW RIGHT    CLINICAL HISTORY:  hand injury;    COMPARISON:  None    FINDINGS:  There is no acute fracture visualized.  Surgical hardware is visualized in the 2nd metacarpal.  There is no dislocation.                                   The Emergency Provider reviewed the vital signs and test results, which are outlined above.    ED Discussion             Medication(s) given in the ER:  Medications - No data to display        Follow-up Information     Valeria Abbasi MD In 2 days.    Specialty:  Family Medicine  Contact information:  [address] [place] [id number]  [phone]                       Discharge Medication List as of 5/16/2018 11:41 PM      START taking these medications    Details   gentamicin (GARAMYCIN) 0.3 % ophthalmic solution Place 2 drops into the left eye 4 (four) times daily., Starting Wed 5/16/2018, Until Mon 5/21/2018, Print                Medical Decision Making      All findings were reviewed with the patient/family in detail.   All remaining questions and concerns were addressed at that time.  Patient/family has been counseled regarding the need for follow-up as well as the indication to return to the emergency room should new or worrisome developments occur.              MDM               Clinical Impression:        ICD-10-CM ICD-9-CM   1. Contusion of right hand, initial encounter S60.221A 923.20   2. Conjunctivitis, unspecified conjunctivitis type, unspecified laterality H10.9 372.30             Stephany Trinidad PA-C  05/17/18 0014

## 2018-08-18 PROCEDURE — 99284 EMERGENCY DEPT VISIT MOD MDM: CPT

## 2018-08-19 ENCOUNTER — HOSPITAL ENCOUNTER (EMERGENCY)
Facility: HOSPITAL | Age: 48
Discharge: HOME | End: 2018-08-19
Payer: COMMERCIAL

## 2018-08-19 VITALS
RESPIRATION RATE: 17 BRPM | HEART RATE: 75 BPM | HEIGHT: 67 IN | DIASTOLIC BLOOD PRESSURE: 91 MMHG | OXYGEN SATURATION: 99 % | TEMPERATURE: 98 F | WEIGHT: 195.75 LBS | SYSTOLIC BLOOD PRESSURE: 136 MMHG | BODY MASS INDEX: 30.72 KG/M2

## 2018-08-19 DIAGNOSIS — R05.9 COUGH: ICD-10-CM

## 2018-08-19 DIAGNOSIS — D84.9 IMMUNOCOMPROMISED STATE: ICD-10-CM

## 2018-08-19 DIAGNOSIS — J98.8 RESPIRATORY INFECTION: Primary | ICD-10-CM

## 2018-08-19 DIAGNOSIS — J90 PLEURAL EFFUSION, RIGHT: ICD-10-CM

## 2018-08-19 LAB
ANION GAP SERPL CALC-SCNC: 8 MMOL/L
BASOPHILS # BLD AUTO: 0.03 K/UL
BASOPHILS NFR BLD: 0.5 %
BUN SERPL-MCNC: 13 MG/DL
CALCIUM SERPL-MCNC: 9.2 MG/DL
CHLORIDE SERPL-SCNC: 103 MMOL/L
CO2 SERPL-SCNC: 26 MMOL/L
CREAT SERPL-MCNC: 0.9 MG/DL
DIFFERENTIAL METHOD: ABNORMAL
EOSINOPHIL # BLD AUTO: 0 K/UL
EOSINOPHIL NFR BLD: 0.6 %
ERYTHROCYTE [DISTWIDTH] IN BLOOD BY AUTOMATED COUNT: 14.4 %
EST. GFR  (AFRICAN AMERICAN): >60 ML/MIN/1.73 M^2
EST. GFR  (NON AFRICAN AMERICAN): >60 ML/MIN/1.73 M^2
GLUCOSE SERPL-MCNC: 108 MG/DL
HCT VFR BLD AUTO: 32.4 %
HGB BLD-MCNC: 11.2 G/DL
LACTATE SERPL-SCNC: 1 MMOL/L
LDH SERPL L TO P-CCNC: 311 U/L
LYMPHOCYTES # BLD AUTO: 4 K/UL
LYMPHOCYTES NFR BLD: 61.8 %
MCH RBC QN AUTO: 30.3 PG
MCHC RBC AUTO-ENTMCNC: 34.6 G/DL
MCV RBC AUTO: 88 FL
MONOCYTES # BLD AUTO: 0.4 K/UL
MONOCYTES NFR BLD: 6.4 %
NEUTROPHILS # BLD AUTO: 2 K/UL
NEUTROPHILS NFR BLD: 30.7 %
PLATELET # BLD AUTO: 398 K/UL
PMV BLD AUTO: 9.2 FL
POTASSIUM SERPL-SCNC: 3.7 MMOL/L
RBC # BLD AUTO: 3.7 M/UL
SODIUM SERPL-SCNC: 137 MMOL/L
WBC # BLD AUTO: 6.42 K/UL

## 2018-08-19 PROCEDURE — 80048 BASIC METABOLIC PNL TOTAL CA: CPT

## 2018-08-19 PROCEDURE — 25500020 PHARM REV CODE 255: Performed by: PHYSICIAN ASSISTANT

## 2018-08-19 PROCEDURE — 87040 BLOOD CULTURE FOR BACTERIA: CPT | Mod: 59

## 2018-08-19 PROCEDURE — 83605 ASSAY OF LACTIC ACID: CPT

## 2018-08-19 PROCEDURE — 83615 LACTATE (LD) (LDH) ENZYME: CPT

## 2018-08-19 PROCEDURE — 85025 COMPLETE CBC W/AUTO DIFF WBC: CPT

## 2018-08-19 PROCEDURE — 25000003 PHARM REV CODE 250: Performed by: PHYSICIAN ASSISTANT

## 2018-08-19 RX ORDER — ATORVASTATIN CALCIUM 10 MG/1
10 TABLET, FILM COATED ORAL DAILY
COMMUNITY

## 2018-08-19 RX ORDER — PROMETHAZINE HYDROCHLORIDE AND DEXTROMETHORPHAN HYDROBROMIDE 6.25; 15 MG/5ML; MG/5ML
SYRUP ORAL
Qty: 120 ML | Refills: 0 | Status: SHIPPED | OUTPATIENT
Start: 2018-08-19

## 2018-08-19 RX ORDER — SULFAMETHOXAZOLE AND TRIMETHOPRIM 800; 160 MG/1; MG/1
1 TABLET ORAL EVERY 12 HOURS
Qty: 20 TABLET | Refills: 0 | Status: SHIPPED | OUTPATIENT
Start: 2018-08-19 | End: 2018-08-29

## 2018-08-19 RX ORDER — QUETIAPINE FUMARATE 100 MG/1
TABLET, FILM COATED ORAL
COMMUNITY

## 2018-08-19 RX ORDER — SULFAMETHOXAZOLE AND TRIMETHOPRIM 800; 160 MG/1; MG/1
1 TABLET ORAL
Status: COMPLETED | OUTPATIENT
Start: 2018-08-19 | End: 2018-08-19

## 2018-08-19 RX ORDER — BENZONATATE 100 MG/1
100 CAPSULE ORAL
Status: COMPLETED | OUTPATIENT
Start: 2018-08-19 | End: 2018-08-19

## 2018-08-19 RX ADMIN — IOHEXOL 75 ML: 350 INJECTION, SOLUTION INTRAVENOUS at 03:08

## 2018-08-19 RX ADMIN — BENZONATATE 100 MG: 100 CAPSULE ORAL at 03:08

## 2018-08-19 RX ADMIN — SULFAMETHOXAZOLE AND TRIMETHOPRIM 1 TABLET: 800; 160 TABLET ORAL at 03:08

## 2018-08-19 NOTE — ED NOTES
The pt is in [place] on her cell phone talking and laughing. Pt appears to be in no apparent distress.

## 2018-08-19 NOTE — ED PROVIDER NOTES
SCRIBE #1 NOTE: I, Radha Martinez, am scribing for, and in the presence of, Navneet Bailey PA-C. I have scribed the entire note.      History      Chief Complaint   Patient presents with    Nasal Congestion     +cough       Review of patient's allergies indicates:   Allergen Reactions    Ibuprofen Diarrhea and Nausea And Vomiting        HPI   HPI    8/19/2018, 12:23 AM   History obtained from the patient      History of Present Illness: Les Hernandez is a 48 y.o. female patient with PMHx of HIV who presents to the Emergency Department for an emergent evaluation due to productive cough, nasal congestion and drainage which onset gradually 4 days ago. Symptoms are constant in course and moderate in degree. She states that the cough is worse at night or when laying flat. No additional mitigating or exacerbating factors reported. She reports having similar symptoms in the past with pneumonia. She reports no recent sick contacts. Pt reports that she is taking an anti-viral medication for HIV but does not recall the name of the drug. She does not know her last CD4 result. She states that her viral load is undetectable. Patient denies any fever, chills, rash, sore throat, ear pain, CP, SOB, HA, and all other sxs at this time. Prior Tx includes Mucinex with no relief of sxs. No further complaints or concerns at this time.         Arrival mode: Personal vehicle    PCP: Valeria Abbasi MD       Past Medical History:  Past Medical History:   Diagnosis Date    Diabetes mellitus     High cholesterol     HIV (human immunodeficiency virus infection)     Hypertension     Pneumonia     Schizophrenia        Past Surgical History:  Past Surgical History:   Procedure Laterality Date    LIPOMA RESECTION      SPLENECTOMY, TOTAL      TUBAL LIGATION           Family History:  Family history reviewed not relevant    Social History:  Social History     Tobacco Use    Smoking status: Never Smoker    Smokeless tobacco:  Never Used   Substance and Sexual Activity    Alcohol use: No    Drug use: No    Sexual activity: Not given       ROS   Review of Systems   Constitutional: Negative for activity change, appetite change, chills, diaphoresis, fatigue and fever.   HENT: Positive for congestion and rhinorrhea. Negative for ear pain, sore throat and trouble swallowing.    Eyes: Negative for pain, discharge and redness.   Respiratory: Positive for cough (productive). Negative for shortness of breath, wheezing and stridor.    Cardiovascular: Negative for chest pain, palpitations and leg swelling.   Gastrointestinal: Negative for abdominal distention, abdominal pain, constipation, diarrhea, nausea and vomiting.   Genitourinary: Negative for decreased urine volume, dysuria, flank pain, frequency, menstrual problem and urgency.   Musculoskeletal: Negative for back pain, gait problem, myalgias, neck pain and neck stiffness.   Skin: Negative for rash and wound.   Allergic/Immunologic: Positive for immunocompromised state.   Neurological: Negative for dizziness, syncope, speech difficulty, weakness, light-headedness, numbness and headaches.   Psychiatric/Behavioral: Negative for confusion.   All other systems reviewed and are negative.    Physical Exam      Initial Vitals [08/19/18 0009]   BP Pulse Resp Temp SpO2   132/69 80 18 98.2 °F (36.8 °C) 98 %      MAP       --          Physical Exam  Nursing Notes and Vital Signs Reviewed.  Constitutional: Patient is in minimal distress. Well-developed and well-nourished. Non-toxic appearance.   Head: Normocephalic.  Eyes: PERRL. Sclera white.   Ears: Right TM normal. Left TM normal. No erythema. No bulging. No effusion or air-fluid levels. No perforation.   Nose: Swollen nasal mucosa. Rhinorrhea.   Throat: Moist mucous membranes. Posterior oropharynx is symmetric without erythema. No trismus. Normal handling of secretions. No stridor.   Neck: Supple. Full ROM.  Cardiovascular: Regular rate. Regular  "rhythm.   Pulmonary/Chest: No respiratory distress. No wheezing or rales. Frequent cough. Speaks in complete sentences.   Abdominal: Soft and non-distended. There is no tenderness.  Musculoskeletal: Moves all extremities. No obvious deformities. No edema. No calf tenderness.  Skin: Warm and dry. No rash.   Neurological:  Alert, awake, and appropriate.  Normal speech.  No acute focal neurological deficits are appreciated.  Psychiatric: Normal affect. Good eye contact. Appropriate in content.    ED Course    Procedures  ED Vital Signs:  Vitals:    08/19/18 0009   BP: 132/69   Pulse: 80   Resp: 18   Temp: 98.2 °F (36.8 °C)   TempSrc: Oral   SpO2: 98%   Weight: 88.8 kg (195 lb 12.3 oz)   Height: 5' 7" (1.702 m)       Abnormal Lab Results:  Labs Reviewed   CBC W/ AUTO DIFFERENTIAL - Abnormal; Notable for the following components:       Result Value    RBC 3.70 (*)     Hemoglobin 11.2 (*)     Hematocrit 32.4 (*)     Platelets 398 (*)     Gran% 30.7 (*)     Lymph% 61.8 (*)     All other components within normal limits   CULTURE, BLOOD   CULTURE, BLOOD   BASIC METABOLIC PANEL   LACTATE DEHYDROGENASE   LACTIC ACID, PLASMA        All Lab Results:  Results for orders placed or performed during the hospital encounter of 08/19/18   CBC auto differential   Result Value Ref Range    WBC 6.42 3.90 - 12.70 K/uL    RBC 3.70 (L) 4.00 - 5.40 M/uL    Hemoglobin 11.2 (L) 12.0 - 16.0 g/dL    Hematocrit 32.4 (L) 37.0 - 48.5 %    MCV 88 82 - 98 fL    MCH 30.3 27.0 - 31.0 pg    MCHC 34.6 32.0 - 36.0 g/dL    RDW 14.4 11.5 - 14.5 %    Platelets 398 (H) 150 - 350 K/uL    MPV 9.2 9.2 - 12.9 fL    Gran # (ANC) 2.0 1.8 - 7.7 K/uL    Lymph # 4.0 1.0 - 4.8 K/uL    Mono # 0.4 0.3 - 1.0 K/uL    Eos # 0.0 0.0 - 0.5 K/uL    Baso # 0.03 0.00 - 0.20 K/uL    Gran% 30.7 (L) 38.0 - 73.0 %    Lymph% 61.8 (H) 18.0 - 48.0 %    Mono% 6.4 4.0 - 15.0 %    Eosinophil% 0.6 0.0 - 8.0 %    Basophil% 0.5 0.0 - 1.9 %    Differential Method Automated    Basic metabolic " panel   Result Value Ref Range    Sodium 137 136 - 145 mmol/L    Potassium 3.7 3.5 - 5.1 mmol/L    Chloride 103 95 - 110 mmol/L    CO2 26 23 - 29 mmol/L    Glucose 108 70 - 110 mg/dL    BUN, Bld 13 6 - 20 mg/dL    Creatinine 0.9 0.5 - 1.4 mg/dL    Calcium 9.2 8.7 - 10.5 mg/dL    Anion Gap 8 8 - 16 mmol/L    eGFR if African American >60 >60 mL/min/1.73 m^2    eGFR if non African American >60 >60 mL/min/1.73 m^2         Imaging Results:  Imaging Results          CT Chest With Contrast (In process)                X-Ray Chest PA And Lateral (Preliminary result)  Result time 08/19/18 03:22:23    ED Interpretation by Navneet Bailey PA-C (08/19/18 03:22:23, [facility], Emergency Medicine)    Appears to be a small pleural effusion RLL.                                       The Emergency Provider reviewed the vital signs and test results, which are outlined above.    ED Discussion           ED Medication(s):  Medications   sulfamethoxazole-trimethoprim 800-160mg per tablet 1 tablet (not administered)   benzonatate capsule 100 mg (not administered)   omnipaque 350 iohexol 75 mL (75 mLs Intravenous Given 8/19/18 0304)       New Prescriptions    PROMETHAZINE-DEXTROMETHORPHAN (PROMETHAZINE-DM) 6.25-15 MG/5 ML SYRP    Take 1 teaspoon PO q 4 hrs prn cough/congestion    SULFAMETHOXAZOLE-TRIMETHOPRIM 800-160MG (BACTRIM DS) 800-160 MG TAB    Take 1 tablet by mouth every 12 (twelve) hours. for 10 days       Follow-up Information     Valeria Abbasi MD. Schedule an appointment as soon as possible for a visit in 2 days.    Specialty:  Family Medicine  Contact information:  [address] [facility] [address] [phone]             [facility].    Specialty:  Emergency Medicine  Why:  If symptoms worsen in any way.  Contact information:  [address] [facility] [address] [phone]                   Medical Decision Making    Medical Decision Making:   Initial  Assessment:   Pt with history of HIV and pneumonia presents to the ER for an emergent evaluation due to coughing and congestion x 4 days. Cough worse when laying down. Unaware of CD4 count. States that viral load is undetectable.   Differential Diagnosis:   Pneumonia, Bronchitis, Pleural effusion, TB, pulmonary abscess, empyema, etc   Clinical Tests:   Lab Tests: Ordered and Reviewed  Radiological Study: Ordered and Reviewed     Additional MDM:   X-Rays: I have independently interpreted X-Ray(s) - see notes.        Scribe Attestation:   Scribe #1: I performed the above scribed service and the documentation accurately describes the services I performed. I attest to the accuracy of the note.    Attending:   Physician Attestation Statement for Scribe #1: I, Navneet Bailey PA-C, personally performed the services described in this documentation, as scribed by Radha Martinez, in my presence, and it is both accurate and complete.          Clinical Impression       ICD-10-CM ICD-9-CM   1. Respiratory infection J98.8 519.8   2. Cough R05 786.2   3. Immunocompromised state D84.9 279.3   4. Pleural effusion, right J90 511.9       Disposition:   Disposition: Discharged  Condition: Stable         Navneet Bailey PA-C  08/19/18 0334

## 2018-08-21 LAB
BACTERIA BLD CULT: NORMAL

## 2018-08-24 LAB — BACTERIA BLD CULT: NORMAL

## 2019-09-25 ENCOUNTER — HOSPITAL ENCOUNTER (EMERGENCY)
Facility: HOSPITAL | Age: 49
Discharge: HOME | End: 2019-09-26
Attending: EMERGENCY MEDICINE
Payer: MEDICARE

## 2019-09-25 VITALS
TEMPERATURE: 99 F | WEIGHT: 195.44 LBS | RESPIRATION RATE: 20 BRPM | HEIGHT: 67 IN | SYSTOLIC BLOOD PRESSURE: 150 MMHG | DIASTOLIC BLOOD PRESSURE: 87 MMHG | OXYGEN SATURATION: 98 % | HEART RATE: 79 BPM | BODY MASS INDEX: 30.67 KG/M2

## 2019-09-25 DIAGNOSIS — B20 HIV DISEASE: ICD-10-CM

## 2019-09-25 DIAGNOSIS — J32.9 SINUSITIS, UNSPECIFIED CHRONICITY, UNSPECIFIED LOCATION: Primary | ICD-10-CM

## 2019-09-25 DIAGNOSIS — R05.9 COUGH: ICD-10-CM

## 2019-09-25 PROCEDURE — 99284 EMERGENCY DEPT VISIT MOD MDM: CPT

## 2019-09-25 RX ORDER — LORATADINE 10 MG/1
10 TABLET ORAL DAILY
Qty: 14 TABLET | Refills: 0 | Status: SHIPPED | OUTPATIENT
Start: 2019-09-25 | End: 2019-10-09

## 2019-09-25 RX ORDER — AZITHROMYCIN 250 MG/1
250 TABLET, FILM COATED ORAL DAILY
Qty: 6 TABLET | Refills: 0 | Status: SHIPPED | OUTPATIENT
Start: 2019-09-25 | End: 2019-09-25 | Stop reason: ALTCHOICE

## 2019-09-25 RX ORDER — LORATADINE 10 MG/1
10 TABLET ORAL DAILY
Qty: 14 TABLET | Refills: 0 | Status: SHIPPED | OUTPATIENT
Start: 2019-09-25 | End: 2019-09-25 | Stop reason: SDUPTHER

## 2019-09-25 RX ORDER — PREDNISONE 20 MG/1
40 TABLET ORAL DAILY
Qty: 10 TABLET | Refills: 0 | Status: SHIPPED | OUTPATIENT
Start: 2019-09-25 | End: 2019-09-30

## 2019-09-25 RX ORDER — FLUTICASONE PROPIONATE 50 MCG
1 SPRAY, SUSPENSION (ML) NASAL 2 TIMES DAILY PRN
Qty: 15 G | Refills: 0 | Status: SHIPPED | OUTPATIENT
Start: 2019-09-25

## 2019-09-25 RX ORDER — CODEINE PHOSPHATE AND GUAIFENESIN 10; 100 MG/5ML; MG/5ML
5 SOLUTION ORAL 3 TIMES DAILY PRN
Qty: 118 ML | Refills: 0 | Status: SHIPPED | OUTPATIENT
Start: 2019-09-25 | End: 2019-10-05

## 2019-09-25 RX ORDER — AMOXICILLIN AND CLAVULANATE POTASSIUM 875; 125 MG/1; MG/1
1 TABLET, FILM COATED ORAL 2 TIMES DAILY
Qty: 20 TABLET | Refills: 0 | Status: SHIPPED | OUTPATIENT
Start: 2019-09-25 | End: 2019-10-05

## 2019-09-26 NOTE — ED PROVIDER NOTES
History      Chief Complaint   Patient presents with    Nasal Congestion     headaches, sneezing, runny nose and congestion; bilateral earaches, sinus pressure across nose       Review of patient's allergies indicates:   Allergen Reactions    Ibuprofen Diarrhea and Nausea And Vomiting        HPI   HPI    9/25/2019, 11:29 PM   History obtained from the patient      History of Present Illness: Les Hernandez is a 49 y.o. female patient with PMHX HIV, DM, HTN, cholesterol, schizophrenia presents to the Emergency Department with c/o congestion and nonproductive cough, bilateral ear pain, and HA onset 3 days ago.  Symptoms are constant and moderate in severity. Patient denies any sore throat, fever, n/v/d. All other sxs at this time. No further complaints or concerns at this time.     Arrival mode: Personal vehicle      PCP: Valeria Abbasi MD       Past Medical History:  Past Medical History:   Diagnosis Date    Diabetes mellitus     High cholesterol     HIV (human immunodeficiency virus infection)     Hypertension     Pneumonia     Schizophrenia        Past Surgical History:  Past Surgical History:   Procedure Laterality Date    LIPOMA RESECTION      SPLENECTOMY, TOTAL      TUBAL LIGATION           Family History:  No family history on file.    Social History:  Social History     Tobacco Use    Smoking status: Never Smoker    Smokeless tobacco: Never Used   Substance and Sexual Activity    Alcohol use: No    Drug use: No    Sexual activity: Not on file       ROS   Review of Systems   Constitutional: Negative for chills and fever.   HENT: Positive for ear pain, rhinorrhea, sinus pressure, sinus pain and sneezing. Negative for congestion, ear discharge and sore throat.    Eyes: Negative for pain.   Respiratory: Positive for cough. Negative for chest tightness, shortness of breath and wheezing.    Cardiovascular: Negative for chest pain.   Gastrointestinal: Negative for abdominal pain,  constipation, diarrhea, nausea and vomiting.   Genitourinary: Negative for decreased urine volume, difficulty urinating, dysuria, flank pain, frequency, hematuria, vaginal bleeding and vaginal discharge.   Musculoskeletal: Negative for back pain, gait problem and neck pain.   Skin: Negative for rash.   Neurological: Negative for dizziness, syncope, weakness and headaches.       Physical Exam      Initial Vitals [09/25/19 2255]   BP Pulse Resp Temp SpO2   (!) 150/87 79 20 99.2 °F (37.3 °C) 98 %      MAP       --          Physical Exam   Constitutional: She is oriented to person, place, and time. She appears well-developed and well-nourished. No distress.   HENT:   Head: Normocephalic and atraumatic.   Right Ear: Hearing, tympanic membrane, external ear and ear canal normal.   Left Ear: Hearing, tympanic membrane, external ear and ear canal normal.   Nose: Mucosal edema and rhinorrhea present. Right sinus exhibits frontal sinus tenderness. Left sinus exhibits frontal sinus tenderness.   Mouth/Throat: Uvula is midline, oropharynx is clear and moist and mucous membranes are normal. No oropharyngeal exudate, posterior oropharyngeal edema or posterior oropharyngeal erythema.   Eyes: Pupils are equal, round, and reactive to light. Conjunctivae and EOM are normal.   Neck: Normal range of motion. Neck supple. No neck rigidity. Normal range of motion present.   Cardiovascular: Normal rate, regular rhythm and normal heart sounds.   Pulmonary/Chest: Effort normal and breath sounds normal. No respiratory distress. She exhibits no tenderness.   Abdominal: Soft. Bowel sounds are normal. There is no tenderness. There is no rigidity, no rebound and no guarding.   Musculoskeletal: Normal range of motion.   Neurological: She is alert and oriented to person, place, and time.   Skin: Skin is warm and dry.       Nursing Notes and Vital Signs Reviewed.    ED Course    Procedures  ED Vital Signs:  Vitals:    09/25/19 2255   BP: (!) 150/87  "  Pulse: 79   Resp: 20   Temp: 99.2 °F (37.3 °C)   TempSrc: Oral   SpO2: 98%   Weight: 88.6 kg (195 lb 7 oz)   Height: 5' 7" (1.702 m)       Abnormal Lab Results:  Labs Reviewed - No data to display     All Lab Results:  None    Imaging Results:  Imaging Results    None                       The Emergency Provider reviewed the vital signs and test results, which are outlined above.    ED Discussion     11:38 PM:  Discussed with pt all pertinent ED information and results. Discussed pt dx of sinusitis and plan of tx. Gave pt all f/u and return to the ED instructions. All questions and concerns were addressed at this time. Pt expresses understanding of information and instructions, and is comfortable with plan to discharge. Pt is stable for discharge.    Patient presents with upper respiratory and flulike symptoms. Based on my assessment in the ED, I do not suspect any respiratory, airway, pulmonary, cardiovascular (including myocarditis), metabolic, CNS, medical, or surgical emergency medical condition. I have discussed with the patient and/or caregiver signs and symptoms for secondary bacterial infections, such as pneumonia. I believe that the patient's symptoms are most consistent with a viral illness, possibly influenza. Patient is safe for discharge home with conservative therapy.    I discussed with patient and/or family/caretaker that evaluation in the ED does not suggest any emergent or life threatening medical conditions requiring immediate intervention beyond what was provided in the ED, and I believe patient is safe for discharge.  Regardless, an unremarkable evaluation in the ED does not preclude the development or presence of a serious of life threatening condition. As such, patient was instructed to return immediately for any worsening or change in current symptoms.    ED Medication(s):  Medications - No data to display  New Prescriptions    AMOXICILLIN-CLAVULANATE 875-125MG (AUGMENTIN) 875-125 MG PER " TABLET    Take 1 tablet by mouth 2 (two) times daily. for 10 days    FLUTICASONE PROPIONATE (FLONASE) 50 MCG/ACTUATION NASAL SPRAY    1 spray (50 mcg total) by Each Nostril route 2 (two) times daily as needed for Rhinitis or Allergies.    GUAIFENESIN-CODEINE 100-10 MG/5 ML (CHERATUSSIN AC)  MG/5 ML SYRUP    Take 5 mLs by mouth 3 (three) times daily as needed for Cough.    LORATADINE (CLARITIN) 10 MG TABLET    Take 1 tablet (10 mg total) by mouth once daily. for 14 days    PREDNISONE (DELTASONE) 20 MG TABLET    Take 2 tablets (40 mg total) by mouth once daily. for 5 days      Follow-up Information     Valeria Abbasi MD In 2 days.    Specialty:  Family Medicine  Why:  Follow up with your doctor for further evaluation, Return to ED for any concerns.  Contact information:  [facility] [id number] [phone]                       Medical Decision Making    Medical Decision Making:   MIPS Measure #331, #332, and #333    Antibiotics Prescribed for Acute Viral Sinusitis (Overuse) and CT Scan for Acute Sinusitis.    Poor performance:  Prescribing a  non amoxicillin based antibiotics when treating patients with acute sinusitis without documented reason.  Ordering a dedicated sinus CT scan of patient's without documenting reasons for your documentation.    Desired outcome:  Avoiding ordering CT scans of the sinuses to make the diagnosis of acute sinusitis.  Avoid non amoxicillin based antibiotics and treating patients with acute sinusitis.  Avoid antibiotic prescriptions in patients with a diagnosis of acute sinusitis within 10 days of symptom onset.      The patient has had symptoms of acute sinusitis for greater than 10 days: No  I gave the patient antibiotics in the emergency department and/or prescribed an antibiotic: Yes  The patient has acute sinusitis is cause, or presumed to be caused, by bacterial infection:  Yes      Additional documentation:  Medical reason for providing antibiotics with  acute sinusitis symptoms for less than 10 days (N/A) or The pt has DM, HIV, and no spleen.   Medical reasoning for prescribing an antibiotic that was not amoxicillin based for acute sinusitis: (N/A)     The patient had a sinus CT scan ordered in the emergency department.  Indication for the CT scan was: NA                    Clinical Impression       ICD-10-CM ICD-9-CM   1. Sinusitis, unspecified chronicity, unspecified location J32.9 473.9   2. Cough R05 786.2   3. HIV disease B20 042       Disposition:   Disposition: Discharged  Condition: Stable         Patricia Nieto NP  09/25/19 1511

## 2019-09-27 ENCOUNTER — DOCUMENTATION ONLY (OUTPATIENT)
Dept: EMERGENCY MEDICINE | Facility: HOSPITAL | Age: 49
End: 2019-09-27

## 2020-02-07 ENCOUNTER — HOSPITAL ENCOUNTER (EMERGENCY)
Facility: HOSPITAL | Age: 50
Discharge: HOME | End: 2020-02-07
Attending: EMERGENCY MEDICINE
Payer: MEDICARE

## 2020-02-07 VITALS
OXYGEN SATURATION: 98 % | RESPIRATION RATE: 20 BRPM | SYSTOLIC BLOOD PRESSURE: 148 MMHG | HEART RATE: 91 BPM | DIASTOLIC BLOOD PRESSURE: 88 MMHG | BODY MASS INDEX: 30.56 KG/M2 | TEMPERATURE: 98 F | WEIGHT: 194.69 LBS | HEIGHT: 67 IN

## 2020-02-07 DIAGNOSIS — J06.9 VIRAL URI: Primary | ICD-10-CM

## 2020-02-07 PROCEDURE — 99283 EMERGENCY DEPT VISIT LOW MDM: CPT

## 2020-02-07 NOTE — ED PROVIDER NOTES
History      Chief Complaint   Patient presents with    URI     Watery eyes, congestion and cough.  States began yesterday.       Review of patient's allergies indicates:   Allergen Reactions    Ibuprofen Diarrhea and Nausea And Vomiting        HPI   HPI    2/7/2020, 4:20 PM   History obtained from the patient      History of Present Illness: Les Hernandez is a 49 y.o. female patient who presents to the Emergency Department for nasal congestion, runny nose, watery eyes since yesterday.  No cough. Denies f/n/v, neck stiffness or facial pain.  She is HIV+, does not know CD4/tcell but says she is undetectable.  Symptoms are constant and mild in severity.  No further complaints or concerns at this time.           PCP: Valeria Abbasi MD       Past Medical History:  Past Medical History:   Diagnosis Date    Diabetes mellitus     High cholesterol     HIV (human immunodeficiency virus infection)     Hypertension     Pneumonia     Schizophrenia          Past Surgical History:  Past Surgical History:   Procedure Laterality Date    LIPOMA RESECTION      SPLENECTOMY, TOTAL      TUBAL LIGATION             Family History:  No family history on file.        Social History:  Social History     Tobacco Use    Smoking status: Never Smoker    Smokeless tobacco: Never Used   Substance and Sexual Activity    Alcohol use: No    Drug use: No    Sexual activity: Not on file       ROS   Review of Systems   Constitutional: Negative for activity change, appetite change, chills and fever.   HENT: Positive for congestion and rhinorrhea. Negative for ear discharge, facial swelling and trouble swallowing.    Eyes: Negative for pain, and visual disturbance.   Respiratory: Negative for chest tightness and shortness of breath.    Cardiovascular: Negative for chest pain and palpitations.   Gastrointestinal: Negative for abdominal distention, abdominal pain and vomiting.   Endocrine: Negative for cold intolerance and  "heat intolerance.   Genitourinary: Negative for dysuria, flank pain and hematuria.   Musculoskeletal: Negative for neck pain and neck stiffness.   Skin: Negative for color change, pallor and rash.   Neurological: Negative for syncope and weakness.   Hematological: Does not bruise/bleed easily.   All other systems reviewed and are negative.    Review of Systems    Physical Exam      Initial Vitals [02/07/20 1612]   BP Pulse Resp Temp SpO2   (!) 148/88 91 20 98.3 °F (36.8 °C) 98 %      MAP       --         Physical Exam  Vital signs and nursing notes reviewed.  Constitutional: Patient is in NAD. Awake and alert. Well-developed and well-nourished.  Head: Atraumatic. Normocephalic.  Eyes: PERRL. EOM intact. Conjunctivae nl. No scleral icterus.  ENT: Mucous membranes are moist. Oropharynx is clear.  Nasal congestion.  No facial ttp or edema.  Neck: Supple. No JVD. No lymphadenopathy.  No meningismus  Cardiovascular: Regular rate and rhythm. No murmurs, rubs, or gallops. Distal pulses are 2+ and symmetric.  Pulmonary/Chest: No respiratory distress. Clear to auscultation bilaterally. No wheezing, rales, or rhonchi.  Abdominal: Soft. Non-distended. No TTP. No rebound, guarding, or rigidity. Good bowel sounds.  Genitourinary: No CVA tenderness  Musculoskeletal: Moves all extremities. No edema.   Skin: Warm and dry.  Neurological: Awake and alert. No acute focal neurological deficits are appreciated.  Psychiatric: Normal affect. Good eye contact. Appropriate in content.      ED Course      Procedures  ED Vital Signs:  Vitals:    02/07/20 1612   BP: (!) 148/88   Pulse: 91   Resp: 20   Temp: 98.3 °F (36.8 °C)   TempSrc: Oral   SpO2: 98%   Weight: 88.3 kg (194 lb 10.7 oz)   Height: 5' 7" (1.702 m)                 Imaging Results:  Imaging Results    None            The Emergency Provider reviewed the vital signs and test results, which are outlined above.    ED Discussion             Medication(s) given in the ER:  Medications - " No data to display        Follow-up Information     Valeria Abbasi MD In 2 days.    Specialty:  Family Medicine  Contact information:  [address] [place] [address] [phone]                       New Prescriptions    LORATADINE-PSEUDOEPHEDRINE  MG (CLARITIN-D 24 HOUR)  MG PER 24 HR TABLET    Take 1 tablet by mouth once daily. for 14 days          Medical Decision Making        MDM               Clinical Impression:        ICD-10-CM ICD-9-CM   1. Viral URI J06.9 465.9            Disposition  Stable  Discharged     Stephany Trinidad PA-C  02/07/20 5567

## 2021-01-14 NOTE — DISCHARGE INSTRUCTIONS
Return to the Emergency room for any worsening of symptoms, fever above 100.8, shortness of breath, wheezing, vomiting, unable to tolerate anything by mouth, chest pain, abdominal pain,dizziness, weakness, or any concerns.      What Type Of Note Output Would You Prefer (Optional)?: Standard Output How Severe Is Your Skin Lesion?: moderate Has Your Skin Lesion Been Treated?: not been treated Is This A New Presentation, Or A Follow-Up?: Growth